# Patient Record
Sex: MALE | Race: WHITE | NOT HISPANIC OR LATINO | Employment: UNEMPLOYED | URBAN - METROPOLITAN AREA
[De-identification: names, ages, dates, MRNs, and addresses within clinical notes are randomized per-mention and may not be internally consistent; named-entity substitution may affect disease eponyms.]

---

## 2017-12-04 ENCOUNTER — OFFICE VISIT (OUTPATIENT)
Dept: MEDICAL GROUP | Facility: MEDICAL CENTER | Age: 37
End: 2017-12-04
Attending: NURSE PRACTITIONER
Payer: MEDICAID

## 2017-12-04 VITALS
WEIGHT: 191 LBS | HEIGHT: 67 IN | SYSTOLIC BLOOD PRESSURE: 120 MMHG | DIASTOLIC BLOOD PRESSURE: 70 MMHG | HEART RATE: 60 BPM | OXYGEN SATURATION: 97 % | BODY MASS INDEX: 29.98 KG/M2 | TEMPERATURE: 97.7 F | RESPIRATION RATE: 16 BRPM

## 2017-12-04 DIAGNOSIS — Z00.00 ROUTINE HEALTH MAINTENANCE: ICD-10-CM

## 2017-12-04 DIAGNOSIS — R14.3 EXCESSIVE GAS: ICD-10-CM

## 2017-12-04 DIAGNOSIS — Z30.09 ENCOUNTER FOR VASECTOMY COUNSELING: ICD-10-CM

## 2017-12-04 DIAGNOSIS — Z13.220 SCREENING CHOLESTEROL LEVEL: ICD-10-CM

## 2017-12-04 DIAGNOSIS — Z23 NEED FOR INFLUENZA VACCINATION: ICD-10-CM

## 2017-12-04 DIAGNOSIS — Z13.21 ENCOUNTER FOR VITAMIN DEFICIENCY SCREENING: ICD-10-CM

## 2017-12-04 DIAGNOSIS — E78.2 MIXED HYPERLIPIDEMIA: ICD-10-CM

## 2017-12-04 DIAGNOSIS — Z13.0 SCREENING, IRON DEFICIENCY ANEMIA: ICD-10-CM

## 2017-12-04 DIAGNOSIS — Z13.29 SCREENING FOR THYROID DISORDER: ICD-10-CM

## 2017-12-04 DIAGNOSIS — E66.9 OBESITY (BMI 35.0-39.9 WITHOUT COMORBIDITY): ICD-10-CM

## 2017-12-04 DIAGNOSIS — E66.9 OBESITY (BMI 30-39.9): ICD-10-CM

## 2017-12-04 DIAGNOSIS — Z86.010 HX OF COLONIC POLYP: ICD-10-CM

## 2017-12-04 DIAGNOSIS — Z13.1 SCREENING FOR DIABETES MELLITUS (DM): ICD-10-CM

## 2017-12-04 PROBLEM — E78.5 HYPERLIPIDEMIA: Status: ACTIVE | Noted: 2017-12-04

## 2017-12-04 PROCEDURE — 99203 OFFICE O/P NEW LOW 30 MIN: CPT | Mod: 25 | Performed by: NURSE PRACTITIONER

## 2017-12-04 PROCEDURE — 90471 IMMUNIZATION ADMIN: CPT | Performed by: NURSE PRACTITIONER

## 2017-12-04 PROCEDURE — 90686 IIV4 VACC NO PRSV 0.5 ML IM: CPT | Performed by: NURSE PRACTITIONER

## 2017-12-04 PROCEDURE — 99203 OFFICE O/P NEW LOW 30 MIN: CPT | Performed by: NURSE PRACTITIONER

## 2017-12-04 RX ORDER — ATORVASTATIN CALCIUM 40 MG/1
40 TABLET, FILM COATED ORAL NIGHTLY
COMMUNITY
End: 2018-07-19

## 2017-12-04 RX ORDER — SIMETHICONE 80 MG
80 TABLET,CHEWABLE ORAL EVERY 6 HOURS PRN
Qty: 30 TAB | Refills: 3 | Status: SHIPPED | OUTPATIENT
Start: 2017-12-04 | End: 2018-07-19

## 2017-12-04 ASSESSMENT — PATIENT HEALTH QUESTIONNAIRE - PHQ9: CLINICAL INTERPRETATION OF PHQ2 SCORE: 0

## 2017-12-04 ASSESSMENT — PAIN SCALES - GENERAL: PAINLEVEL: NO PAIN

## 2017-12-04 NOTE — ASSESSMENT & PLAN NOTE
Pt is over weight and has Hyperlipidemia/JHigh triglyceride hx.  States he eats a lot of Carbs.  REcommended he reduce intake of sugar and carbs and increase  Exercise.

## 2017-12-04 NOTE — ASSESSMENT & PLAN NOTE
Pt reports he passes a lot of gas.  We discussed him cutting back on carbs, milk, and observing which food items  Seem to cause gas. Will trial Mylicon. Recommended Whole 30 diet plan for  30 days then slowly reintroduce foods. Given handout.

## 2017-12-04 NOTE — PROGRESS NOTES
"    Chief Complaint   Patient presents with   • New Patient   • Hyperlipidemia         HISTORY OF THE PRESENT ILLNESS: Patient is a 37 y.o. male.   Billy presents as New Patient.    Currently out of work, \"do [employer]\"    His PMH includes:  Hyperlipidemia  Obesity  Family hx early colon ca  (last colonoscopy 2015 - 1 polyp)    Nev :  No Entries.    Routine health maintenance  [facility] info for Vision check.    Obesity (BMI 30-39.9)  Pt is over weight and has Hyperlipidemia/JHigh triglyceride hx.  States he eats a lot of Carbs.  REcommended he reduce intake of sugar and carbs and increase  Exercise.    Hyperlipidemia  Pt reports hx since age 29 yrs old he had high cholesterol and high triglycerides  On atorvastatin 40 mg for sometime.  We discussed him reducing sugar/carbs and saturated fats in his diet  Recheck lipid profile w labs.    Encounter for vasectomy counseling  Pt reports has Urology [place] consult on Dec 20 for vasectomy counseling and plan.  Has 4 girls at home w his wife.  Needs Referral.     Hx of colonic polyp  Pt reports family hx of early colon ca.  He himself had last colonoscopy at age 35 yrs old ( 2 yrs ago) and   They found one polyp and he underwent a single polypectomy.  Instructed to return for Colonoscopy in 10 yrs.    Denies dark or bloody stools.      Excessive gas  Pt reports he passes a lot of gas.  We discussed him cutting back on carbs, milk, and observing which food items  Seem to cause gas. Will trial Mylicon. Recommended Whole 30 diet plan for  30 days then slowly reintroduce foods. Given handout.      Allergies: Patient has no known allergies.    Current Outpatient Prescriptions Ordered in [facility]   Medication Sig Dispense Refill   • atorvastatin (LIPITOR) 40 MG Tab Take 40 mg by mouth every evening.     • simethicone (MYLICON) 80 MG Chew Tab Take 1 Tab by mouth every 6 hours as needed for Flatulence. 30 Tab 3     No current Epic-ordered facility-administered medications on " "file.        Past Medical History:   Diagnosis Date   • Hyperlipidemia        History reviewed. No pertinent surgical history.    Social History   Substance Use Topics   • Smoking status: Never Smoker   • Smokeless tobacco: Never Used   • Alcohol use Yes      Comment: occassional        Family Status   Relation Status   • Mother    • Father      Family History   Problem Relation Age of Onset   • Cancer Mother    • Hyperlipidemia Father        Review of Systems   Constitutional: Negative for fever, chills, weight loss and malaise/fatigue.   HENT: Negative for ear pain, nosebleeds, congestion, sore throat and neck pain.    Eyes: Negative for blurred vision.   Respiratory: Negative for cough, sputum production, shortness of breath and wheezing.    Cardiovascular: Negative for chest pain, palpitations, orthopnea and leg swelling.   Gastrointestinal: Negative for heartburn, nausea, vomiting and abdominal pain. Positive for excessive \"gas\"  Genitourinary: Negative for dysuria, urgency and frequency.   Musculoskeletal: Negative for myalgias, back pain and joint pain.   Skin: Negative for rash and itching.   Neurological: Negative for dizziness, tingling, tremors, sensory change, focal weakness and headaches.   Endo/Heme/Allergies: Does not bruise/bleed easily.   Psychiatric/Behavioral: Negative for depression, anxiety, or memory loss.         Current medications, allergies, and problem list reviewed with patient and updated in  [facility] today.      Exam: Temperature 36.5 °C (97.7 °F), height 1.689 m (5' 6.5\"), weight 86.6 kg (191 lb), SpO2 97 %.   Vitals:    17 0953   BP: 120/70   Pulse: 60   Resp: 16   Temp: 36.5 °C (97.7 °F)   SpO2: 97%   Weight: 86.6 kg (191 lb)   Height: 1.689 m (5' 6.5\")     General: Normal appearing. No distress.  HEENT: Normocephalic. Eyes conjunctiva clear lids without ptosis, pupils equal and reactive to light accommodation, ears normal shape and contour, canals are clear bilaterally, " TM's are benign, nasal mucosa benign, oropharynx is without erythema, edema or exudates.   Neck: Supple without JVD. Thyroid is not enlarged.  Pulmonary: Clear to ausculation.  Normal effort. No rales, ronchi, or wheezing.  Cardiovascular: Regular rate and rhythm.   Abdomen: Soft, nontender, nondistended.  Neurologic: Grossly nonfocal  Lymph: No cervical or supraclavicular lymph nodes are palpable  Skin: Warm and dry.  No obvious lesions.  Musculoskeletal: Normal gait. No extremity cyanosis, clubbing, or edema.  Psych: Normal mood and affect. Alert and oriented x3. Judgment and insight is normal.      Assessment/Plan  1. Routine health maintenance  [facility] info given for vision check.   2. Screening, iron deficiency anemia  CBC WITH DIFFERENTIAL   3. Screening for thyroid disorder  TSH   4. Encounter for vitamin deficiency screening  VITAMIN D,25 HYDROXY    VITAMIN B12   5. Screening cholesterol level  LIPID PROFILE   6. Screening for diabetes mellitus (DM)  COMP METABOLIC PANEL    HEMOGLOBIN A1C   7. Need for influenza vaccination  INFLUENZA VACCINE QUAD INJ >3Y(PF)   8. Obesity (BMI 35.0-39.9 without comorbidity)  Patient identified as having weight management issue.  Appropriate orders and counseling given.   9. Obesity (BMI 30-39.9)  Pt instructed to limit carb/sugar intake and increase exercise.   10. Mixed hyperlipidemia  Continue Atorvastatin 40 mg/day. Pt to reduce sugar/carb and saturated fat intake.   11. Excessive gas  CELIAC DISEASE AB PANEL    simethicone (MYLICON) 80 MG Chew Tab   12. Hx of colonic polyp  OCCULT BLOOD FECES IMMUNOASSAY   13. Encounter for vasectomy counseling  REFERRAL TO UROLOGY   Follow up in 3 weeks for lab review.. Call or return if questions, concerns, or worsening condition.

## 2017-12-04 NOTE — ASSESSMENT & PLAN NOTE
Pt reports family hx of early colon ca.  He himself had last colonoscopy at age 35 yrs old ( 2 yrs ago) and   They found one polyp and he underwent a single polypectomy.  Instructed to return for Colonoscopy in 10 yrs.    Denies dark or bloody stools.

## 2017-12-06 ENCOUNTER — HOSPITAL ENCOUNTER (OUTPATIENT)
Dept: LAB | Facility: MEDICAL CENTER | Age: 37
End: 2017-12-06
Attending: NURSE PRACTITIONER
Payer: MEDICAID

## 2017-12-06 DIAGNOSIS — Z13.0 SCREENING, IRON DEFICIENCY ANEMIA: ICD-10-CM

## 2017-12-06 DIAGNOSIS — Z13.21 ENCOUNTER FOR VITAMIN DEFICIENCY SCREENING: ICD-10-CM

## 2017-12-06 DIAGNOSIS — R14.3 EXCESSIVE GAS: ICD-10-CM

## 2017-12-06 DIAGNOSIS — Z13.220 SCREENING CHOLESTEROL LEVEL: ICD-10-CM

## 2017-12-06 DIAGNOSIS — Z13.1 SCREENING FOR DIABETES MELLITUS (DM): ICD-10-CM

## 2017-12-06 DIAGNOSIS — Z13.29 SCREENING FOR THYROID DISORDER: ICD-10-CM

## 2017-12-06 LAB
25(OH)D3 SERPL-MCNC: 31 NG/ML (ref 30–100)
ALBUMIN SERPL BCP-MCNC: 4.9 G/DL (ref 3.2–4.9)
ALBUMIN/GLOB SERPL: 2 G/DL
ALP SERPL-CCNC: 66 U/L (ref 30–99)
ALT SERPL-CCNC: 44 U/L (ref 2–50)
ANION GAP SERPL CALC-SCNC: 9 MMOL/L (ref 0–11.9)
AST SERPL-CCNC: 26 U/L (ref 12–45)
BASOPHILS # BLD AUTO: 0.6 % (ref 0–1.8)
BASOPHILS # BLD: 0.03 K/UL (ref 0–0.12)
BILIRUB SERPL-MCNC: 1 MG/DL (ref 0.1–1.5)
BUN SERPL-MCNC: 13 MG/DL (ref 8–22)
CALCIUM SERPL-MCNC: 9.7 MG/DL (ref 8.5–10.5)
CHLORIDE SERPL-SCNC: 102 MMOL/L (ref 96–112)
CHOLEST SERPL-MCNC: 185 MG/DL (ref 100–199)
CO2 SERPL-SCNC: 28 MMOL/L (ref 20–33)
CREAT SERPL-MCNC: 0.94 MG/DL (ref 0.5–1.4)
EOSINOPHIL # BLD AUTO: 0.1 K/UL (ref 0–0.51)
EOSINOPHIL NFR BLD: 2 % (ref 0–6.9)
ERYTHROCYTE [DISTWIDTH] IN BLOOD BY AUTOMATED COUNT: 38.3 FL (ref 35.9–50)
EST. AVERAGE GLUCOSE BLD GHB EST-MCNC: 100 MG/DL
GFR SERPL CREATININE-BSD FRML MDRD: >60 ML/MIN/1.73 M 2
GLOBULIN SER CALC-MCNC: 2.4 G/DL (ref 1.9–3.5)
GLUCOSE SERPL-MCNC: 84 MG/DL (ref 65–99)
HBA1C MFR BLD: 5.1 % (ref 0–5.6)
HCT VFR BLD AUTO: 46.8 % (ref 42–52)
HDLC SERPL-MCNC: 42 MG/DL
HGB BLD-MCNC: 16.2 G/DL (ref 14–18)
IMM GRANULOCYTES # BLD AUTO: 0.02 K/UL (ref 0–0.11)
IMM GRANULOCYTES NFR BLD AUTO: 0.4 % (ref 0–0.9)
LDLC SERPL CALC-MCNC: 88 MG/DL
LYMPHOCYTES # BLD AUTO: 1.45 K/UL (ref 1–4.8)
LYMPHOCYTES NFR BLD: 29.3 % (ref 22–41)
MCH RBC QN AUTO: 30.1 PG (ref 27–33)
MCHC RBC AUTO-ENTMCNC: 34.6 G/DL (ref 33.7–35.3)
MCV RBC AUTO: 86.8 FL (ref 81.4–97.8)
MONOCYTES # BLD AUTO: 0.44 K/UL (ref 0–0.85)
MONOCYTES NFR BLD AUTO: 8.9 % (ref 0–13.4)
NEUTROPHILS # BLD AUTO: 2.91 K/UL (ref 1.82–7.42)
NEUTROPHILS NFR BLD: 58.8 % (ref 44–72)
NRBC # BLD AUTO: 0 K/UL
NRBC BLD AUTO-RTO: 0 /100 WBC
PLATELET # BLD AUTO: 278 K/UL (ref 164–446)
PMV BLD AUTO: 11.3 FL (ref 9–12.9)
POTASSIUM SERPL-SCNC: 4 MMOL/L (ref 3.6–5.5)
PROT SERPL-MCNC: 7.3 G/DL (ref 6–8.2)
RBC # BLD AUTO: 5.39 M/UL (ref 4.7–6.1)
SODIUM SERPL-SCNC: 139 MMOL/L (ref 135–145)
TRIGL SERPL-MCNC: 275 MG/DL (ref 0–149)
TSH SERPL DL<=0.005 MIU/L-ACNC: 1.48 UIU/ML (ref 0.3–3.7)
VIT B12 SERPL-MCNC: 321 PG/ML (ref 211–911)
WBC # BLD AUTO: 5 K/UL (ref 4.8–10.8)

## 2017-12-06 PROCEDURE — 36415 COLL VENOUS BLD VENIPUNCTURE: CPT

## 2017-12-06 PROCEDURE — 80053 COMPREHEN METABOLIC PANEL: CPT

## 2017-12-06 PROCEDURE — 82306 VITAMIN D 25 HYDROXY: CPT

## 2017-12-06 PROCEDURE — 82607 VITAMIN B-12: CPT

## 2017-12-06 PROCEDURE — 84443 ASSAY THYROID STIM HORMONE: CPT

## 2017-12-06 PROCEDURE — 80061 LIPID PANEL: CPT

## 2017-12-06 PROCEDURE — 85025 COMPLETE CBC W/AUTO DIFF WBC: CPT

## 2017-12-06 PROCEDURE — 82784 ASSAY IGA/IGD/IGG/IGM EACH: CPT

## 2017-12-06 PROCEDURE — 83036 HEMOGLOBIN GLYCOSYLATED A1C: CPT

## 2017-12-06 PROCEDURE — 83516 IMMUNOASSAY NONANTIBODY: CPT

## 2017-12-07 LAB — IGA SERPL-MCNC: 230 MG/DL (ref 68–408)

## 2017-12-08 LAB — TTG IGA SER IA-ACNC: 2 U/ML (ref 0–3)

## 2017-12-20 ENCOUNTER — OFFICE VISIT (OUTPATIENT)
Dept: MEDICAL GROUP | Facility: MEDICAL CENTER | Age: 37
End: 2017-12-20
Attending: NURSE PRACTITIONER
Payer: MEDICAID

## 2017-12-20 VITALS
TEMPERATURE: 96.4 F | BODY MASS INDEX: 30.13 KG/M2 | HEART RATE: 91 BPM | WEIGHT: 192 LBS | DIASTOLIC BLOOD PRESSURE: 64 MMHG | HEIGHT: 67 IN | OXYGEN SATURATION: 96 % | RESPIRATION RATE: 12 BRPM | SYSTOLIC BLOOD PRESSURE: 112 MMHG

## 2017-12-20 DIAGNOSIS — E78.2 MIXED HYPERLIPIDEMIA: ICD-10-CM

## 2017-12-20 DIAGNOSIS — R14.3 EXCESSIVE GAS: ICD-10-CM

## 2017-12-20 DIAGNOSIS — E55.9 VITAMIN D DEFICIENCY: ICD-10-CM

## 2017-12-20 DIAGNOSIS — Z30.09 ENCOUNTER FOR VASECTOMY COUNSELING: ICD-10-CM

## 2017-12-20 PROBLEM — Z13.29 SCREENING FOR THYROID DISORDER: Status: RESOLVED | Noted: 2017-12-04 | Resolved: 2017-12-20

## 2017-12-20 PROBLEM — Z13.0 SCREENING, IRON DEFICIENCY ANEMIA: Status: RESOLVED | Noted: 2017-12-04 | Resolved: 2017-12-20

## 2017-12-20 PROBLEM — Z13.220 SCREENING CHOLESTEROL LEVEL: Status: RESOLVED | Noted: 2017-12-04 | Resolved: 2017-12-20

## 2017-12-20 PROBLEM — Z00.00 ROUTINE HEALTH MAINTENANCE: Status: RESOLVED | Noted: 2017-12-04 | Resolved: 2017-12-20

## 2017-12-20 PROBLEM — Z13.21 ENCOUNTER FOR VITAMIN DEFICIENCY SCREENING: Status: RESOLVED | Noted: 2017-12-04 | Resolved: 2017-12-20

## 2017-12-20 PROCEDURE — 99212 OFFICE O/P EST SF 10 MIN: CPT | Performed by: NURSE PRACTITIONER

## 2017-12-20 PROCEDURE — 99213 OFFICE O/P EST LOW 20 MIN: CPT | Performed by: NURSE PRACTITIONER

## 2017-12-20 NOTE — ASSESSMENT & PLAN NOTE
Pt states he uses the Mylicon intermittently when he has excessive gas and it has been working well.  We discussed him monitoring the foods he eats and avoid those that cause him to have more gas.

## 2017-12-20 NOTE — ASSESSMENT & PLAN NOTE
Pt reports he saw Urologist this am for appt to discuss Vasectomy in future.  Denies any concerns or issues.

## 2017-12-20 NOTE — PROGRESS NOTES
"Billy presents to the clinic for Results.  His wife and one of their children( infant) are with him today for his encounter.  His PMH includes:    Hyperlipidemia  Obesity  Family hx early colon ca  (last colonoscopy 2015 - 1 polyp)     Nev :  No Entries.    Review of Records shows  12/4/17 Clinic New Patient visit, Hx of high cholesterol, on Atorvastatin, Wanting referral to urology for Vasectomy.                Hx of Colon Polyp 2005. Referred to Urology. Labs ordered. RX for Mylicon, continue Atorvastatin.    Results Review:  12/6/17 Labs- CBC normal, CMP normal, A1C= 5.1, Vit B12 normal, TSH= 1.480, Celiac tests negative,                          Vitamin D= 31 (borderline), cholesterol Total= 185, Triglycerides= 275, HDL= 42, LDl= 88    Chief Complaint: Results    HPI:      Hyperlipidemia  Pt has hx of Hyperlipidemia and has been taking Atorvastatin 40 mg  We reviewed his Lipid panel which is good except for elevated Triglycerides= 275.  Patient reports his Triglycerides used to be up in the \"800's\" but since being on   Atorvastatin has been less. He is taking Omega 3 FA's TID as well which we talked  About is also helping lower his Triglycerides. Recommended he reduce his  Sugar and carb intake to further reduce his Triglycerides and then f/u Lipid panel  In 3-5 months. Pt agrees, but states may want to be on a Fibrate if triglycerides elevated  And we discussed pros and cons and if needed I'd recommend a low dose Fibrate medication.    Vitamin D deficiency  We discussed his labs including borderline Vit D level= 31.   I recommended addition of Vit D Supplement and discussed reasons   For an optimal Vit D level.    Excessive gas  Pt states he uses the Mylicon intermittently when he has excessive gas and it has been working well.  We discussed him monitoring the foods he eats and avoid those that cause him to have more gas.    Encounter for vasectomy counseling  Pt reports he saw Urologist this am for appt to " "discuss Vasectomy in future.  Denies any concerns or issues.      Patient Active Problem List    Diagnosis Date Noted   • Vitamin D deficiency 12/20/2017   • Obesity (BMI 30-39.9) 12/04/2017   • Hyperlipidemia 12/04/2017   • Excessive gas 12/04/2017   • Encounter for vasectomy counseling 12/04/2017   • Hx of colonic polyp 12/04/2017       Allergies:Patient has no known allergies.    Current Outpatient Prescriptions   Medication Sig Dispense Refill   • Cholecalciferol 2000 UNIT Cap Take 1 Cap by mouth every day. 30 Cap 11   • atorvastatin (LIPITOR) 40 MG Tab Take 40 mg by mouth every evening.     • simethicone (MYLICON) 80 MG Chew Tab Take 1 Tab by mouth every 6 hours as needed for Flatulence. 30 Tab 3     No current facility-administered medications for this visit.        Social History   Substance Use Topics   • Smoking status: Never Smoker   • Smokeless tobacco: Never Used   • Alcohol use Yes      Comment: occassional        Family History   Problem Relation Age of Onset   • Cancer Mother    • Hyperlipidemia Father        ROS:  Review of Systems   See HPI Above    Exam:  Blood pressure 112/64, pulse 91, temperature (!) 35.8 °C (96.4 °F), resp. rate 12, height 1.689 m (5' 6.5\"), weight 87.1 kg (192 lb), SpO2 96 %.  General:  Well nourished, well developed male in NAD  HENT:Head is grossly normal. Sclera clear.  Neck: Supple. Trachea is midline.  Pulmonary: speaks in full sentences w ease.  Normal effort.  Cardiovascular: Regular rate, normal BP.  Abdomen-Abdomen is soft  Upper extremities-  Good ROM  Lower extremities- neg for edema, redness.  Neuro- A & O x 4. Speech clear and appropriate.     Current medications, allergies, and problem list reviewed with patient and [facility].    Assessment/Plan:  1. Mixed hyperlipidemia  LIPID PROFILE in 3-5 months.   Continue Lipitor and Omega 3 FA's, Decrease sugar/carb intake in diet.  Future, if elevated triglycerides alone consider add low dose Fibrate " medication.   2. Vitamin D deficiency  Cholecalciferol 2000 UNIT Cap   3. Excessive gas  Continue Mylicon prn, Avoid gas trigger foods.   4. Encounter for vasectomy counseling  Continue w Urologist as planned.   Follow up in 5-6 months. Call or return if questions, concerns, or worsening condition.

## 2017-12-20 NOTE — ASSESSMENT & PLAN NOTE
"Pt has hx of Hyperlipidemia and has been taking Atorvastatin 40 mg  We reviewed his Lipid panel which is good except for elevated Triglycerides= 275.  Patient reports his Triglycerides used to be up in the \"800's\" but since being on   Atorvastatin has been less. He is taking Omega 3 FA's TID as well which we talked  About is also helping lower his Triglycerides. Recommended he reduce his  Sugar and carb intake to further reduce his Triglycerides and then f/u Lipid panel  In 3-5 months. Pt agrees, but states may want to be on a Fibrate if triglycerides elevated  And we discussed pros and cons and if needed I'd recommend a low dose Fibrate medication.  "

## 2017-12-20 NOTE — ASSESSMENT & PLAN NOTE
We discussed his labs including borderline Vit D level= 31.   I recommended addition of Vit D Supplement and discussed reasons   For an optimal Vit D level.

## 2018-05-21 ENCOUNTER — HOSPITAL ENCOUNTER (OUTPATIENT)
Dept: LAB | Facility: MEDICAL CENTER | Age: 38
End: 2018-05-21
Attending: NURSE PRACTITIONER
Payer: MEDICAID

## 2018-05-21 DIAGNOSIS — E78.2 MIXED HYPERLIPIDEMIA: ICD-10-CM

## 2018-05-21 LAB
CHOLEST SERPL-MCNC: 138 MG/DL (ref 100–199)
HDLC SERPL-MCNC: 40 MG/DL
LDLC SERPL CALC-MCNC: 40 MG/DL
TRIGL SERPL-MCNC: 291 MG/DL (ref 0–149)

## 2018-05-21 PROCEDURE — 80061 LIPID PANEL: CPT

## 2018-05-21 PROCEDURE — 36415 COLL VENOUS BLD VENIPUNCTURE: CPT

## 2018-07-19 ENCOUNTER — OFFICE VISIT (OUTPATIENT)
Dept: MEDICAL GROUP | Facility: MEDICAL CENTER | Age: 38
End: 2018-07-19
Attending: NURSE PRACTITIONER
Payer: MEDICAID

## 2018-07-19 VITALS
RESPIRATION RATE: 16 BRPM | HEIGHT: 66 IN | TEMPERATURE: 98.6 F | DIASTOLIC BLOOD PRESSURE: 76 MMHG | SYSTOLIC BLOOD PRESSURE: 120 MMHG | WEIGHT: 184 LBS | OXYGEN SATURATION: 99 % | HEART RATE: 68 BPM | BODY MASS INDEX: 29.57 KG/M2

## 2018-07-19 DIAGNOSIS — E55.9 VITAMIN D DEFICIENCY: ICD-10-CM

## 2018-07-19 DIAGNOSIS — E66.9 OBESITY (BMI 30-39.9): ICD-10-CM

## 2018-07-19 DIAGNOSIS — D22.9 ATYPICAL MOLE: ICD-10-CM

## 2018-07-19 DIAGNOSIS — F41.9 ANXIETY: ICD-10-CM

## 2018-07-19 DIAGNOSIS — Z80.8 FAMILY HISTORY OF SKIN CANCER: ICD-10-CM

## 2018-07-19 DIAGNOSIS — E78.49 OTHER HYPERLIPIDEMIA: ICD-10-CM

## 2018-07-19 PROCEDURE — 99212 OFFICE O/P EST SF 10 MIN: CPT | Performed by: NURSE PRACTITIONER

## 2018-07-19 PROCEDURE — 99214 OFFICE O/P EST MOD 30 MIN: CPT | Performed by: NURSE PRACTITIONER

## 2018-07-19 RX ORDER — PROPRANOLOL HYDROCHLORIDE 20 MG/1
20 TABLET ORAL
Qty: 30 TAB | Refills: 2 | Status: SHIPPED | OUTPATIENT
Start: 2018-07-19

## 2018-07-19 ASSESSMENT — PAIN SCALES - GENERAL: PAINLEVEL: NO PAIN

## 2018-07-19 NOTE — ASSESSMENT & PLAN NOTE
Pt reports has anxiety w public speaking and has good results and Propranolol.  Asking for refill of an old Propranolol 20 mg RX which he uses in rare occasions for public  Speaking in [employer].

## 2018-07-19 NOTE — ASSESSMENT & PLAN NOTE
Reports atypical moles to left forehead area, top of head and left upper arm.  Asking for Referral to Derm.

## 2018-07-19 NOTE — ASSESSMENT & PLAN NOTE
Pt reports he changed his diet back in April to eating no meat and no cheese.  He is purposely worked on losing weight.  He states he feels much better.  Weight on 12/20/17 192 pounds, weight today 184 pounds.  Discussed importance of not over eating Carbs.

## 2018-07-19 NOTE — ASSESSMENT & PLAN NOTE
Hx of Hyperlipidemia  We reviewed his 12/6/17 and 5/21/18 Lipid profile.  Both show elevated Triglycerides while on ATorvastatin  Wanting to come off Statin.    In April changed diet and lost wt on purpose and stopped meat and cheese.

## 2018-07-19 NOTE — PROGRESS NOTES
Chief Complaint:   Chief Complaint   Patient presents with   • Hyperlipidemia       HPI:  Billy is here today for a follow-up on Lipid panel, mole concerns, Anxiety w public speaking    His PMH includes:  Hyperlipidemia  Obesity  Family hx early colon ca  (last colonoscopy 2015 - 1 polyp)    Nev  Report:  No Entries    Review of Records;  5/21/18 Katrin Total= 138, Trigly= 291, HDL= 40, LDL= 40  12/20/17 Clinic visit for lab Results, Elev Triglcreides. While on lipitor 40mg  Life style changes recommended, Repeat Lipid panel in 3-5 months  Vit D deficiency. RX Vit D3. To see urologist about Vasectomy wish    12/6/17 Labs- CBC normal, CMP normal, A1C= 5.1, Vit B12 normal, TSH= 1.480, Celiac tests negative,                          Vitamin D= 31 (borderline), cholesterol Total= 185, Triglycerides= 275, HDL= 42, LDl= 88    12/4/17 Clinic New Patient visit, Hx of high cholesterol, on Atorvastatin, Wanting referral to urology for Vasectomy.                Hx of Colon Polyp 2005. Referred to Urology. Labs ordered. RX for Mylicon, continue Atorvastatin.    Atypical mole  Reports atypical moles to left forehead area, top of head and left upper arm.  Asking for Referral to Derm.    Vitamin D deficiency  Hx of borderline Vit D deficiency  Was on Vit D supplement, but now getting more sun.      Hyperlipidemia  Hx of Hyperlipidemia  We reviewed his 12/6/17 and 5/21/18 Lipid profile.  Both show elevated Triglycerides while on ATorvastatin  Wanting to come off Statin.    In April changed diet and lost wt on purpose and stopped meat and cheese.      Anxiety w Public Speaking  Pt reports has anxiety w public speaking and has good results and Propranolol.  Asking for refill of an old Propranolol 20 mg RX which he uses in rare occasions for public  Speaking in  [employer].      Obesity (BMI 30-39.9)  Pt reports he changed his diet back in April to eating no meat and no cheese.  He is purposely worked on losing  "weight.  He states he feels much better.  Weight on 12/20/17 192 pounds, weight today 184 pounds.  Discussed importance of not over eating Carbs.      Patient Active Problem List    Diagnosis Date Noted   • Atypical mole 07/19/2018   • Anxiety 07/19/2018   • Vitamin D deficiency 12/20/2017   • Obesity (BMI 30-39.9) 12/04/2017   • Hyperlipidemia 12/04/2017   • Excessive gas 12/04/2017   • Encounter for vasectomy counseling 12/04/2017   • Hx of colonic polyp 12/04/2017       Allergies:Patient has no known allergies.    Medicines as of today:  Current Outpatient Prescriptions   Medication Sig Dispense Refill   • Cholecalciferol 2000 UNIT Cap Take 1 Cap by mouth every day. 30 Cap 11   • propranolol (INDERAL) 20 MG Tab Take 1 Tab by mouth 1 time daily as needed. 30 Tab 2     No current facility-administered medications for this visit.        Social History   Substance Use Topics   • Smoking status: Never Smoker   • Smokeless tobacco: Never Used   • Alcohol use Yes      Comment: occassional        Past Medical History:   Diagnosis Date   • Hyperlipidemia        Family History   Problem Relation Age of Onset   • Cancer Mother    • Hyperlipidemia Father        ROS:  Review of Systems   See HPI Above    Exam:  Blood pressure 120/76, pulse 68, temperature 37 °C (98.6 °F), resp. rate 16, height 1.689 m (5' 6.5\"), weight 83.5 kg (184 lb), SpO2 99 %. Body mass index is 29.26 kg/m².    General:  Well nourished, well dressed, well developed male in NAD  HENT:Head is grossly normal. PERRL. Small round brown mole on left forehead in hair.  Small sunspot to right temple area. Small mole to top of head.  Neck: Supple. Trachea is midline.  Pulmonary: speaks in full sentences.  Normal effort. No rales, ronchi, or wheezing.   Cardiovascular: Regular rate and rhythm.  Abdomen-Abdomen is soft  Upper extremities- WNL,Good ROM  Lower extremities- neg for edema, redness, tenderness.  Neuro- A & O x 4. Speech clear and appropriate.    Current " medications, allergies, and problem list reviewed with patient and updated in [facility] today.    Assessment/Plan:  1. Atypical mole  REFERRAL TO DERMATOLOGY   2. Vitamin D deficiency  Cholecalciferol 2000 UNIT Cap   3. Other hyperlipidemia  LIPID PROFILE in 2-3 months  Pt to stop Lipitor per patient request,and work on less carbs in diet  Repeat lipid panel in 2-3 months  If elev LDL and Triglycerides, consider low dose Statin, Omega 3 FA's and low dose Fibrate.   4. Anxiety  propranolol (INDERAL) 20 MG Tab   5. Family history of skin cancer  REFERRAL TO DERMATOLOGY   6. Obesity (BMI 30-39.9)  Pt congratulated on purposeful wt loss.       Return in about 3 months (around 10/19/2018) for lab results.

## 2018-07-23 DIAGNOSIS — Z86.010 HX OF COLONIC POLYP: ICD-10-CM

## 2018-10-25 ENCOUNTER — HOSPITAL ENCOUNTER (OUTPATIENT)
Dept: LAB | Facility: MEDICAL CENTER | Age: 38
End: 2018-10-25
Attending: NURSE PRACTITIONER
Payer: MEDICAID

## 2018-10-25 DIAGNOSIS — E78.49 OTHER HYPERLIPIDEMIA: ICD-10-CM

## 2018-10-25 DIAGNOSIS — Z86.010 HX OF COLONIC POLYP: ICD-10-CM

## 2018-10-25 LAB
ABO GROUP BLD: NORMAL
CHOLEST SERPL-MCNC: 244 MG/DL (ref 100–199)
FASTING STATUS PATIENT QL REPORTED: NORMAL
HDLC SERPL-MCNC: 46 MG/DL
LDLC SERPL CALC-MCNC: 129 MG/DL
RH BLD: NORMAL
TRIGL SERPL-MCNC: 345 MG/DL (ref 0–149)

## 2018-10-25 PROCEDURE — 36415 COLL VENOUS BLD VENIPUNCTURE: CPT

## 2018-10-25 PROCEDURE — 86900 BLOOD TYPING SEROLOGIC ABO: CPT

## 2018-10-25 PROCEDURE — 80061 LIPID PANEL: CPT

## 2018-10-25 PROCEDURE — 86901 BLOOD TYPING SEROLOGIC RH(D): CPT

## 2018-11-05 ENCOUNTER — TELEPHONE (OUTPATIENT)
Dept: MEDICAL GROUP | Facility: MEDICAL CENTER | Age: 38
End: 2018-11-05

## 2018-11-05 NOTE — TELEPHONE ENCOUNTER
Please call Billy and let him know his Lipid Panel   Shows elevation of his cholesterol since he stopped the STATIN medication  10/25/18 Total Cholesterol= 249 (up from 138)                 Triglycerides = 345 ( up from 298)                 HDL( Good) = 40 w improvement to 46                  LDL (bad) = 129, ( up from 40)    I recommend a low dose Statin if he is interested.  Ask him and if so, we can send RX.  Lambert

## 2019-01-25 ENCOUNTER — APPOINTMENT (OUTPATIENT)
Dept: RADIOLOGY | Facility: MEDICAL CENTER | Age: 39
End: 2019-01-25
Attending: EMERGENCY MEDICINE
Payer: COMMERCIAL

## 2019-01-25 ENCOUNTER — HOSPITAL ENCOUNTER (EMERGENCY)
Facility: MEDICAL CENTER | Age: 39
End: 2019-01-25
Attending: EMERGENCY MEDICINE
Payer: COMMERCIAL

## 2019-01-25 VITALS
BODY MASS INDEX: 28.41 KG/M2 | WEIGHT: 181 LBS | HEART RATE: 74 BPM | DIASTOLIC BLOOD PRESSURE: 85 MMHG | TEMPERATURE: 98.5 F | HEIGHT: 67 IN | RESPIRATION RATE: 16 BRPM | OXYGEN SATURATION: 98 % | SYSTOLIC BLOOD PRESSURE: 122 MMHG

## 2019-01-25 DIAGNOSIS — S29.019A STRAIN OF THORACIC SPINE, INITIAL ENCOUNTER: ICD-10-CM

## 2019-01-25 DIAGNOSIS — S50.01XA CONTUSION OF RIGHT ELBOW, INITIAL ENCOUNTER: ICD-10-CM

## 2019-01-25 DIAGNOSIS — S39.012A LUMBAR STRAIN, INITIAL ENCOUNTER: ICD-10-CM
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
## 2019-01-25 PROCEDURE — 72131 CT LUMBAR SPINE W/O DYE: CPT

## 2019-01-25 PROCEDURE — 99284 EMERGENCY DEPT VISIT MOD MDM: CPT

## 2019-01-25 PROCEDURE — 73080 X-RAY EXAM OF ELBOW: CPT | Mod: RT

## 2019-01-25 PROCEDURE — 72128 CT CHEST SPINE W/O DYE: CPT

## 2019-01-25 RX ORDER — IBUPROFEN 800 MG/1
800 TABLET ORAL EVERY 8 HOURS PRN
Qty: 20 TAB | Refills: 0 | Status: SHIPPED | OUTPATIENT
Start: 2019-01-25
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
## 2019-08-12 DIAGNOSIS — E55.9 VITAMIN D DEFICIENCY: ICD-10-CM

## 2019-08-12 RX ORDER — ACETAMINOPHEN 160 MG
TABLET,DISINTEGRATING ORAL
Refills: 11 | OUTPATIENT
Start: 2019-08-12

## 2021-06-22 NOTE — ED NOTES
Patient was educated on discharge instructions.  Patient was informed about diagnosis, symptom management, risks, and home care instructions.  Patient verbalized understanding and signed discharge instructions. Prescription handed to patient. Copy of discharge instructions in chart.  Patient ambulated out with steady gait.  Patient has personal belongings.     [id number]